# Patient Record
Sex: FEMALE | Race: ASIAN | NOT HISPANIC OR LATINO
[De-identification: names, ages, dates, MRNs, and addresses within clinical notes are randomized per-mention and may not be internally consistent; named-entity substitution may affect disease eponyms.]

---

## 2017-04-13 ENCOUNTER — APPOINTMENT (OUTPATIENT)
Dept: VASCULAR SURGERY | Facility: CLINIC | Age: 57
End: 2017-04-13

## 2017-04-13 VITALS — SYSTOLIC BLOOD PRESSURE: 124 MMHG | DIASTOLIC BLOOD PRESSURE: 74 MMHG | HEART RATE: 73 BPM

## 2017-04-13 VITALS
HEART RATE: 74 BPM | DIASTOLIC BLOOD PRESSURE: 61 MMHG | SYSTOLIC BLOOD PRESSURE: 117 MMHG | HEIGHT: 67 IN | BODY MASS INDEX: 32.02 KG/M2 | WEIGHT: 204 LBS | TEMPERATURE: 97.6 F

## 2017-11-09 ENCOUNTER — APPOINTMENT (OUTPATIENT)
Dept: VASCULAR SURGERY | Facility: CLINIC | Age: 57
End: 2017-11-09

## 2018-10-23 ENCOUNTER — OUTPATIENT (OUTPATIENT)
Dept: OUTPATIENT SERVICES | Facility: HOSPITAL | Age: 58
LOS: 1 days | Discharge: HOME | End: 2018-10-23
Payer: MEDICAID

## 2018-10-23 DIAGNOSIS — Z98.89 OTHER SPECIFIED POSTPROCEDURAL STATES: Chronic | ICD-10-CM

## 2018-10-23 DIAGNOSIS — R94.39 ABNORMAL RESULT OF OTHER CARDIOVASCULAR FUNCTION STUDY: ICD-10-CM

## 2018-10-23 LAB
BUN SERPL-MCNC: 18 MG/DL — SIGNIFICANT CHANGE UP (ref 7–23)
CALCIUM SERPL-MCNC: 9.2 MG/DL — SIGNIFICANT CHANGE UP (ref 8.4–10.5)
CHLORIDE SERPL-SCNC: 101 MMOL/L — SIGNIFICANT CHANGE UP (ref 98–107)
CO2 SERPL-SCNC: 26 MMOL/L — SIGNIFICANT CHANGE UP (ref 22–31)
CREAT SERPL-MCNC: 0.51 MG/DL — SIGNIFICANT CHANGE UP (ref 0.5–1.3)
GLUCOSE BLDC GLUCOMTR-MCNC: 119 MG/DL — HIGH (ref 70–99)
GLUCOSE BLDC GLUCOMTR-MCNC: 150 MG/DL — HIGH (ref 70–99)
GLUCOSE SERPL-MCNC: 155 MG/DL — HIGH (ref 70–99)
HBA1C BLD-MCNC: 8.6 % — HIGH (ref 4–5.6)
HCT VFR BLD CALC: 38.6 % — SIGNIFICANT CHANGE UP (ref 34.5–45)
HGB BLD-MCNC: 12.2 G/DL — SIGNIFICANT CHANGE UP (ref 11.5–15.5)
MCHC RBC-ENTMCNC: 25.5 PG — LOW (ref 27–34)
MCHC RBC-ENTMCNC: 31.6 % — LOW (ref 32–36)
MCV RBC AUTO: 80.6 FL — SIGNIFICANT CHANGE UP (ref 80–100)
NRBC # FLD: 0 — SIGNIFICANT CHANGE UP
PLATELET # BLD AUTO: 158 K/UL — SIGNIFICANT CHANGE UP (ref 150–400)
PMV BLD: 9.5 FL — SIGNIFICANT CHANGE UP (ref 7–13)
POTASSIUM SERPL-MCNC: 3.8 MMOL/L — SIGNIFICANT CHANGE UP (ref 3.5–5.3)
POTASSIUM SERPL-SCNC: 3.8 MMOL/L — SIGNIFICANT CHANGE UP (ref 3.5–5.3)
RBC # BLD: 4.79 M/UL — SIGNIFICANT CHANGE UP (ref 3.8–5.2)
RBC # FLD: 13.5 % — SIGNIFICANT CHANGE UP (ref 10.3–14.5)
SODIUM SERPL-SCNC: 139 MMOL/L — SIGNIFICANT CHANGE UP (ref 135–145)
WBC # BLD: 6.9 K/UL — SIGNIFICANT CHANGE UP (ref 3.8–10.5)
WBC # FLD AUTO: 6.9 K/UL — SIGNIFICANT CHANGE UP (ref 3.8–10.5)

## 2018-10-23 PROCEDURE — 93010 ELECTROCARDIOGRAM REPORT: CPT

## 2018-10-23 PROCEDURE — 93454 CORONARY ARTERY ANGIO S&I: CPT | Mod: 26

## 2018-10-23 PROCEDURE — 99152 MOD SED SAME PHYS/QHP 5/>YRS: CPT

## 2018-10-23 RX ORDER — SODIUM CHLORIDE 9 MG/ML
3 INJECTION INTRAMUSCULAR; INTRAVENOUS; SUBCUTANEOUS EVERY 8 HOURS
Qty: 0 | Refills: 0 | Status: DISCONTINUED | OUTPATIENT
Start: 2018-10-23 | End: 2018-11-07

## 2018-10-23 NOTE — H&P CARDIOLOGY - FAMILY HISTORY
No pertinent family history in first degree relatives     Family history of cancer     Sibling  Still living? Yes, Estimated age: Age Unknown  Family history of diabetes mellitus, Age at diagnosis: Age Unknown  Family history of hypertension, Age at diagnosis: Age Unknown  Family history of heart disease, Age at diagnosis: Age Unknown

## 2018-10-23 NOTE — H&P CARDIOLOGY - PSH
H/O carpal tunnel repair  right  S/P angioplasty with stent  2015  S/P carotid endarterectomy  right  S/P  section

## 2018-10-23 NOTE — H&P CARDIOLOGY - RS GEN PE MLT RESP DETAILS PC
no rhonchi/respirations non-labored/clear to auscultation bilaterally/good air movement/breath sounds equal/no rales/normal/airway patent

## 2018-10-23 NOTE — H&P CARDIOLOGY - HISTORY OF PRESENT ILLNESS
Patient is a 58 year old woman with history of NSTEMI/CAD s/p stent x1 (pLAD 2014) with 40% in stent restenosis on repeat cath (2016), HTN, HLD, DM, and s/p Right CEA sent for nuclear stress test after experiencing chest pain and shortness of breath with minimal exertion. NST found to be abnormal with moderate anterior wall perfusion defect and poor exercise performance. Patient sent to [facility] for cardiac catheterization for evaluation of progressive CAD. Patient currently laying in bed in NAD. Patient denies any current chest pain, SOB, NAYLOR, HA, dizziness, N/V/D/C, or recent illness,

## 2018-10-23 NOTE — H&P CARDIOLOGY - PMH
CAD (coronary artery disease)    Carotid artery occlusion    Diabetes    Diabetes mellitus type 2 in nonobese    HTN (hypertension)    Hyperlipidemia    Hypertension    Hypertriglyceridemia    NSTEMI (non-ST elevated myocardial infarction)  s/p stent placement to LAD  Obesity

## 2018-12-05 ENCOUNTER — EMERGENCY (EMERGENCY)
Facility: HOSPITAL | Age: 58
LOS: 0 days | Discharge: HOME | End: 2018-12-05
Attending: EMERGENCY MEDICINE
Payer: COMMERCIAL

## 2018-12-05 VITALS
DIASTOLIC BLOOD PRESSURE: 74 MMHG | WEIGHT: 203.93 LBS | OXYGEN SATURATION: 96 % | SYSTOLIC BLOOD PRESSURE: 180 MMHG | TEMPERATURE: 99 F | HEART RATE: 80 BPM | RESPIRATION RATE: 18 BRPM | HEIGHT: 67 IN

## 2018-12-05 DIAGNOSIS — S05.02XA INJURY OF CONJUNCTIVA AND CORNEAL ABRASION WITHOUT FOREIGN BODY, LEFT EYE, INITIAL ENCOUNTER: ICD-10-CM

## 2018-12-05 DIAGNOSIS — E66.9 OBESITY, UNSPECIFIED: ICD-10-CM

## 2018-12-05 DIAGNOSIS — I10 ESSENTIAL (PRIMARY) HYPERTENSION: ICD-10-CM
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
## 2018-12-05 DIAGNOSIS — G56.01 CARPAL TUNNEL SYNDROME, RIGHT UPPER LIMB: ICD-10-CM
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
## 2018-12-05 DIAGNOSIS — I25.2 OLD MYOCARDIAL INFARCTION: ICD-10-CM

## 2018-12-05 DIAGNOSIS — H57.12 OCULAR PAIN, LEFT EYE: ICD-10-CM

## 2018-12-05 DIAGNOSIS — Z98.89 OTHER SPECIFIED POSTPROCEDURAL STATES: Chronic | ICD-10-CM

## 2018-12-05 DIAGNOSIS — E78.5 HYPERLIPIDEMIA, UNSPECIFIED: ICD-10-CM

## 2018-12-05 DIAGNOSIS — I25.10 ATHEROSCLEROTIC HEART DISEASE OF NATIVE CORONARY ARTERY WITHOUT ANGINA PECTORIS: ICD-10-CM

## 2018-12-05 DIAGNOSIS — Z79.82 LONG TERM (CURRENT) USE OF ASPIRIN: ICD-10-CM

## 2018-12-05 DIAGNOSIS — E11.9 TYPE 2 DIABETES MELLITUS WITHOUT COMPLICATIONS: ICD-10-CM

## 2018-12-05 DIAGNOSIS — H92.02 OTALGIA, LEFT EAR: ICD-10-CM

## 2018-12-05 PROCEDURE — 99282 EMERGENCY DEPT VISIT SF MDM: CPT

## 2018-12-05 NOTE — ED PROVIDER NOTE - MEDICAL DECISION MAKING DETAILS
Patient with left eye injury from fabric.  VSS.  No changes in vision.  Exam shows corneal abrasion.  Patient advised to continue her eye drops and lubricant, says that she will be following up with her ophthalmologist, advised to return to the ER for any acute worsening, signs of corneal ulceration were discussed since she is a diabetic.

## 2018-12-05 NOTE — ED ADULT NURSE NOTE - NSIMPLEMENTINTERV_GEN_ALL_ED
Implemented All Universal Safety Interventions:  [place] to call system. Call bell, personal items and telephone within reach. Instruct patient to call for assistance. Room bathroom lighting operational. Non-slip footwear when patient is off stretcher. Physically safe environment: no spills, clutter or unnecessary equipment. Stretcher in lowest position, wheels locked, appropriate side rails in place.

## 2018-12-05 NOTE — ED PROVIDER NOTE - PHYSICAL EXAMINATION
Gen: Alert, NAD, well appearing  Head: NC, AT  Eye: PERRL, EOMI, normal lids, Right eye: grossly normal, no conjunctival erythema or tearing, no corneal abrasion, no blurry vision, Left eye: mild clear tearing, normal conjunctiva, IOPs 18, 20, +2mm corneal abrasion at 5 oclock, no blurry vision  ENT: normal hearing, patent oropharynx without erythema/exudate, uvula midline  Neck: +supple, +Trachea midline  Pulm: Bilateral BS, normal resp effort, no wheeze/stridor/retractions  CV: RRR, no M/R/G, +dist pulses  Abd: soft, NT/ND, +BS, no palpable masses  Mskel: no edema/erythema/cyanosis  Skin: no rash, warm/dry  Neuro: AAOx3, no apparent sensory/motor deficits, coordination intact

## 2018-12-05 NOTE — ED PROVIDER NOTE - OBJECTIVE STATEMENT
Pertinent PMH/PSH/FHx/SHx and Review of Systems contained within:  Patient presents to the ED for left ear tearing and pain.  Patient is well appearing, says that she hit her left eye with a piece of fabric earlier today while dressing, has since been having irritation and tearing.  Denies any vision changes or purulent drainage.  Patient is s/p cataract repair in [facility] 3 weeks ago, is already on moxifloxacin/dexamethasone and lubricant drops for another couple of weeks.  She also has an ophthalmologist in [facility], but came to the ER today because her eye was bothering her.  Denies use of contact lenses or glasses.     Relevant PMHx/SHx/SOCHx/FAMH:  HTN, HLD, DM, CAD/MI s/p 1 stent  Patient denies EtOH/tobacco/illicit substance use.    ROS: No fever/chills, No headache/photophobia/changes in vision, No ear pain/sore throat/dysphagia, No chest pain/palpitations, no SOB/cough/wheeze/stridor, No abdominal pain, No N/V/D/melena, no dysuria/frequency/discharge, No neck/back pain, no rash, no changes in neurological status/function.

## 2018-12-05 NOTE — ED ADULT NURSE NOTE - OBJECTIVE STATEMENT
patient received, came here for left eye pain started today, patient stated unsure if the end of her scarf accidentally hit her eye, complaining of irritation, noted no redness

## 2020-10-01 ENCOUNTER — APPOINTMENT (OUTPATIENT)
Dept: VASCULAR SURGERY | Facility: CLINIC | Age: 60
End: 2020-10-01
Payer: MEDICAID

## 2020-10-01 VITALS
HEART RATE: 75 BPM | DIASTOLIC BLOOD PRESSURE: 82 MMHG | TEMPERATURE: 97.8 F | HEIGHT: 67 IN | BODY MASS INDEX: 29.19 KG/M2 | SYSTOLIC BLOOD PRESSURE: 143 MMHG | WEIGHT: 186 LBS

## 2020-10-01 VITALS — HEART RATE: 76 BPM | DIASTOLIC BLOOD PRESSURE: 81 MMHG | SYSTOLIC BLOOD PRESSURE: 131 MMHG

## 2020-10-01 DIAGNOSIS — I65.21 OCCLUSION AND STENOSIS OF RIGHT CAROTID ARTERY: ICD-10-CM

## 2020-10-01 PROCEDURE — 93880 EXTRACRANIAL BILAT STUDY: CPT

## 2020-10-01 PROCEDURE — 99203 OFFICE O/P NEW LOW 30 MIN: CPT

## 2022-05-11 ENCOUNTER — EMERGENCY (EMERGENCY)
Facility: HOSPITAL | Age: 62
LOS: 0 days | Discharge: HOME | End: 2022-05-11
Attending: STUDENT IN AN ORGANIZED HEALTH CARE EDUCATION/TRAINING PROGRAM
Payer: COMMERCIAL

## 2022-05-11 VITALS
TEMPERATURE: 98 F | HEIGHT: 67 IN | SYSTOLIC BLOOD PRESSURE: 146 MMHG | DIASTOLIC BLOOD PRESSURE: 78 MMHG | HEART RATE: 74 BPM | OXYGEN SATURATION: 97 % | RESPIRATION RATE: 19 BRPM | WEIGHT: 179.9 LBS

## 2022-05-11 DIAGNOSIS — Z98.89 OTHER SPECIFIED POSTPROCEDURAL STATES: Chronic | ICD-10-CM

## 2022-05-11 DIAGNOSIS — E78.5 HYPERLIPIDEMIA, UNSPECIFIED: ICD-10-CM

## 2022-05-11 DIAGNOSIS — Z79.82 LONG TERM (CURRENT) USE OF ASPIRIN: ICD-10-CM

## 2022-05-11 DIAGNOSIS — E78.1 PURE HYPERGLYCERIDEMIA: ICD-10-CM

## 2022-05-11 DIAGNOSIS — E66.9 OBESITY, UNSPECIFIED: ICD-10-CM

## 2022-05-11 DIAGNOSIS — Z79.02 LONG TERM (CURRENT) USE OF ANTITHROMBOTICS/ANTIPLATELETS: ICD-10-CM

## 2022-05-11 DIAGNOSIS — I25.2 OLD MYOCARDIAL INFARCTION: ICD-10-CM

## 2022-05-11 DIAGNOSIS — E11.9 TYPE 2 DIABETES MELLITUS WITHOUT COMPLICATIONS: ICD-10-CM

## 2022-05-11 DIAGNOSIS — I25.10 ATHEROSCLEROTIC HEART DISEASE OF NATIVE CORONARY ARTERY WITHOUT ANGINA PECTORIS: ICD-10-CM

## 2022-05-11 DIAGNOSIS — M25.561 PAIN IN RIGHT KNEE: ICD-10-CM

## 2022-05-11 DIAGNOSIS — Z79.84 LONG TERM (CURRENT) USE OF ORAL HYPOGLYCEMIC DRUGS: ICD-10-CM

## 2022-05-11 DIAGNOSIS — I10 ESSENTIAL (PRIMARY) HYPERTENSION: ICD-10-CM

## 2022-05-11 DIAGNOSIS — Z95.5 PRESENCE OF CORONARY ANGIOPLASTY IMPLANT AND GRAFT: ICD-10-CM

## 2022-05-11 PROCEDURE — 99283 EMERGENCY DEPT VISIT LOW MDM: CPT

## 2022-05-11 PROCEDURE — 73562 X-RAY EXAM OF KNEE 3: CPT | Mod: 26,RT

## 2022-05-11 RX ORDER — LOSARTAN POTASSIUM 100 MG/1
1 TABLET, FILM COATED ORAL
Qty: 0 | Refills: 0 | DISCHARGE

## 2022-05-11 RX ORDER — SITAGLIPTIN AND METFORMIN HYDROCHLORIDE 500; 50 MG/1; MG/1
1 TABLET, FILM COATED ORAL
Qty: 0 | Refills: 0 | DISCHARGE

## 2022-05-11 NOTE — ED PROVIDER NOTE - NS ED ROS FT
Constitutional: See HPI.  MS: see hpi   Neuro: see hpi   Skin: No skin rash.  Except as documented in the HPI, all other systems are negative.

## 2022-05-11 NOTE — ED PROVIDER NOTE - NSFOLLOWUPINSTRUCTIONS_ED_ALL_ED_FT

## 2022-05-11 NOTE — ED ADULT NURSE NOTE - NSICDXPASTMEDICALHX_GEN_ALL_CORE_FT
PAST MEDICAL HISTORY:  CAD (coronary artery disease)     Carotid artery occlusion     Diabetes     Diabetes mellitus type 2 in nonobese     HTN (hypertension)     Hyperlipidemia     Hypertension     Hypertriglyceridemia     NSTEMI (non-ST elevated myocardial infarction) s/p stent placement to LAD    Obesity

## 2022-05-11 NOTE — ED ADULT TRIAGE NOTE - CHIEF COMPLAINT QUOTE
Right knee pain and swelling since last week  Worsening pain yesterday  HX HTN, DM, carotid surgery, CAD with stents

## 2022-05-11 NOTE — ED ADULT NURSE NOTE - OBJECTIVE STATEMENT
received er chair 26 c/o r popliteal/r knee pain intermittently x 4-5 days worse upon bending knee slight swelling noted no deformity noted denies recent injury or known trauma

## 2022-05-11 NOTE — ED ADULT NURSE NOTE - NSICDXFAMILYHX_GEN_ALL_CORE_FT
FAMILY HISTORY:  Family history of cancer  No pertinent family history in first degree relatives    Sibling  Still living? Yes, Estimated age: Age Unknown  Family history of diabetes mellitus, Age at diagnosis: Age Unknown  Family history of heart disease, Age at diagnosis: Age Unknown  Family history of hypertension, Age at diagnosis: Age Unknown

## 2022-05-11 NOTE — ED PROVIDER NOTE - PATIENT PORTAL LINK FT
You can access the FollowMyHealth Patient Portal offered by [facility] by registering at the following website: [email or web address]. By joining [employer]’s FollowMyHealth portal, you will also be able to view your health information using other applications (apps) compatible with our system.

## 2022-05-11 NOTE — ED PROVIDER NOTE - PHYSICAL EXAMINATION
VITAL SIGNS: I have reviewed nursing notes and confirm.  CONSTITUTIONAL: well-appearing, non-toxic, NAD  SKIN: Warm dry, normal skin turgor  HEAD: NCAT  CARD: RRR, no murmurs, rubs or gallops  RESP: clear to ausculation b/l.  No rales, rhonchi, or wheezing  EXT: Full ROM, no bony tenderness, no pedal edema, no calf tenderness  NEURO: normal motor. normal sensory. Normal gait.  PSYCH: Cooperative, appropriate.

## 2022-05-11 NOTE — ED PROVIDER NOTE - OBJECTIVE STATEMENT
62F with no significant pmhs presenting to ED for right knee pain x 5 days, worse w/ flexing knee, pain to posterior knee and states feels pressure behind knee. Patient denies any fevers/chills, trauma, rash or other complaints.

## 2022-05-11 NOTE — ED ADULT NURSE NOTE - NSICDXPASTSURGICALHX_GEN_ALL_CORE_FT
PAST SURGICAL HISTORY:  H/O carpal tunnel repair right    S/P angioplasty with stent 2015    S/P carotid endarterectomy right    S/P  section

## 2022-05-11 NOTE — ED PROVIDER NOTE - CARE PROVIDER_API CALL
Nolvia Chavez)  Orthopaedic Surgery; Surgery of the Hand  [address] [place] [address]  Phone: [phone]  Fax: [phone]  Follow Up Time: 4-6 Days

## 2022-06-05 ENCOUNTER — FORM ENCOUNTER (OUTPATIENT)
Age: 62
End: 2022-06-05

## 2022-06-06 ENCOUNTER — APPOINTMENT (OUTPATIENT)
Dept: MRI IMAGING | Facility: CLINIC | Age: 62
End: 2022-06-06
Payer: MEDICAID

## 2022-06-06 ENCOUNTER — APPOINTMENT (OUTPATIENT)
Dept: ORTHOPEDIC SURGERY | Facility: CLINIC | Age: 62
End: 2022-06-06
Payer: MEDICAID

## 2022-06-06 VITALS — BODY MASS INDEX: 29.03 KG/M2 | HEIGHT: 67 IN | WEIGHT: 185 LBS

## 2022-06-06 DIAGNOSIS — M23.91 UNSPECIFIED INTERNAL DERANGEMENT OF RIGHT KNEE: ICD-10-CM

## 2022-06-06 DIAGNOSIS — I21.9 ACUTE MYOCARDIAL INFARCTION, UNSPECIFIED: ICD-10-CM

## 2022-06-06 DIAGNOSIS — E11.9 TYPE 2 DIABETES MELLITUS W/OUT COMPLICATIONS: ICD-10-CM

## 2022-06-06 PROCEDURE — 73721 MRI JNT OF LWR EXTRE W/O DYE: CPT | Mod: RT

## 2022-06-06 PROCEDURE — 99204 OFFICE O/P NEW MOD 45 MIN: CPT

## 2022-06-06 PROCEDURE — 73562 X-RAY EXAM OF KNEE 3: CPT | Mod: RT

## 2022-06-06 NOTE — DISCUSSION/SUMMARY
[de-identified] : will get her in a hinged knee brace and a stat mri to evaluae insufiency fracture vs acute meniscal tear

## 2022-06-06 NOTE — DATA REVIEWED
[Right] : of the right [Knee] : knee [I reviewed the films/CD] : I reviewed the films/CD [FreeTextEntry1] : normal

## 2022-06-06 NOTE — PHYSICAL EXAM
[Positive] : positive Jenelle [] : negative Lachmann [Right] : right knee [AP] : anteroposterior [Lateral] : lateral [[place]] : skyline [There are no fractures, subluxations or dislocations. No significant abnormalities are seen] : There are no fractures, subluxations or dislocations. No significant abnormalities are seen [FreeTextEntry8] : she is guarding and exam is difficult  [de-identified] : able to slr [TWNoteComboBox7] : flexion 100 degrees [de-identified] : extension 3 degrees

## 2022-06-06 NOTE — HISTORY OF PRESENT ILLNESS
[Sudden] : sudden [10] : 10 [8] : 8 [Dull/Aching] : dull/aching [Localized] : localized [Sharp] : sharp [Constant] : constant [Nothing helps with pain getting better] : Nothing helps with pain getting better [Walking] : walking [Bending forward] : bending forward [Stairs] : stairs [de-identified] : 6-6-22- She states 3 + week of worsening posterior right knee pain. Initially went to an urgent care and was then sent to [facility]. She had xrays which I reviewed today and are normal. She had a doppler which is reported as negative and she was told to take otc nsaids. She states was doing somewhat better and this weekend had a crack sound as she was doing stairs. since then significant increase in pain and inability to ambulate. she is guarding. \par \par PMH mi, cad, dm on plavix [] : no [FreeTextEntry1] : RT Knee [FreeTextEntry3] : 3 weeks  [FreeTextEntry5] : Pt present with Rt knee pain and swelling . Pt stated that there is a lot of pain when she bends her knee. On 5/11/22 she went to [facility] where they did a x-ray. On 6/4/2022 she was going up the stairs when she heard a cracked in her rt knee.  [de-identified] : 5/11/22 [de-identified] : [facility]

## 2022-06-07 ENCOUNTER — APPOINTMENT (OUTPATIENT)
Dept: ORTHOPEDIC SURGERY | Facility: CLINIC | Age: 62
End: 2022-06-07
Payer: MEDICAID

## 2022-06-07 VITALS — BODY MASS INDEX: 29.03 KG/M2 | WEIGHT: 185 LBS | HEIGHT: 67 IN

## 2022-06-07 PROCEDURE — 20610 DRAIN/INJ JOINT/BURSA W/O US: CPT

## 2022-06-07 PROCEDURE — 99213 OFFICE O/P EST LOW 20 MIN: CPT | Mod: 25

## 2022-06-07 NOTE — DISCUSSION/SUMMARY
[de-identified] : would not suggest surgery for the root tear. Will inject with cortisone for the OA, start course of PT

## 2022-06-07 NOTE — PROCEDURE
[Large Joint Injection] : Large joint injection [Right] : of the right [Knee] : knee [Pain] : pain [Alcohol] : alcohol [Betadine] : betadine [Ethyl Chloride sprayed topically] : ethyl chloride sprayed topically [___ cc    3mg] :  Betamethasone (Celestone) ~Vcc of 3mg [___ cc    1%] : Lidocaine ~Vcc of 1%

## 2022-06-07 NOTE — DATA REVIEWED
[MRI] : MRI [Right] : of the right [Knee] : knee [I reviewed the films/CD and agree] : I reviewed the films/CD and agree [I reviewed the films/CD] : I reviewed the films/CD

## 2022-06-07 NOTE — PHYSICAL EXAM
[Positive] : positive Jenelle [Right] : right knee [AP] : anteroposterior [Lateral] : lateral [[place]] : skyline [There are no fractures, subluxations or dislocations. No significant abnormalities are seen] : There are no fractures, subluxations or dislocations. No significant abnormalities are seen [] : negative Lachmann [FreeTextEntry8] : she is guarding and exam is difficult  [de-identified] : able to slr [TWNoteComboBox7] : flexion 100 degrees [de-identified] : extension 3 degrees

## 2022-06-07 NOTE — REASON FOR VISIT
[FreeTextEntry2] : 6/7/22- Had MRI: Impression: \par 1. Medial meniscal tear extending through the root with 5 mm traction cyst and traction edema at the tibial root \par insertion.\par 2. Arthrosis most noted of the medial joint with joint effusion.\par 3. Subacute partial tear of the MCL at the femur.\par 4. Chronic proximal lateral collateral ligament sprain.\par 5. Minor patellofemoral arthrosis.\par 6. Hamstring and gastrocnemius tendinopathy with soft tissue edema.

## 2022-07-19 ENCOUNTER — APPOINTMENT (OUTPATIENT)
Dept: ORTHOPEDIC SURGERY | Facility: CLINIC | Age: 62
End: 2022-07-19

## 2022-07-19 VITALS — BODY MASS INDEX: 29.03 KG/M2 | WEIGHT: 185 LBS | HEIGHT: 67 IN

## 2022-07-19 DIAGNOSIS — S83.221A PERIPHERAL TEAR OF MEDIAL MENISCUS, CURRENT INJURY, RIGHT KNEE, INITIAL ENCOUNTER: ICD-10-CM

## 2022-07-19 DIAGNOSIS — M17.11 UNILATERAL PRIMARY OSTEOARTHRITIS, RIGHT KNEE: ICD-10-CM

## 2022-07-19 PROCEDURE — 99213 OFFICE O/P EST LOW 20 MIN: CPT

## 2022-07-19 NOTE — PHYSICAL EXAM
[Positive] : positive Jenelle [Right] : right knee [AP] : anteroposterior [Lateral] : lateral [[place]] : skyline [There are no fractures, subluxations or dislocations. No significant abnormalities are seen] : There are no fractures, subluxations or dislocations. No significant abnormalities are seen [] : negative Lachmann [FreeTextEntry8] : she is guarding and exam is difficult  [de-identified] : able to slr [TWNoteComboBox7] : flexion 100 degrees [de-identified] : extension 3 degrees

## 2022-07-19 NOTE — HISTORY OF PRESENT ILLNESS
[Sudden] : sudden [10] : 10 [8] : 8 [Dull/Aching] : dull/aching [Localized] : localized [Sharp] : sharp [Constant] : constant [Nothing helps with pain getting better] : Nothing helps with pain getting better [Walking] : walking [Bending forward] : bending forward [Stairs] : stairs [de-identified] : 6-6-22- She states 3 + week of worsening posterior right knee pain. Initially went to an urgent care and was then sent to [facility]. She had xrays which I reviewed today and are normal. She had a doppler which is reported as negative and she was told to take otc nsaids. She states was doing somewhat better and this weekend had a crack sound as she was doing stairs. since then significant increase in pain and inability to ambulate. she is guarding. \par \par PMH mi, cad, dm on plavix [] : no [FreeTextEntry1] : RT Knee [FreeTextEntry3] : 3 weeks  [FreeTextEntry5] : Pt present with Rt knee pain and swelling . Pt stated that there is a lot of pain when she bends her knee. On 5/11/22 she went to [facility] where they did a x-ray. On 6/4/2022 she was going up the stairs when she heard a cracked in her rt knee.  [de-identified] : 5/11/22 [de-identified] : [facility]

## 2022-07-19 NOTE — DISCUSSION/SUMMARY
[de-identified] : Patient needs PT for right knee MMT with OA. this should be 2x/week for 8 weeks. It is medically necessary in lieu of knee arthroscopy

## 2022-07-19 NOTE — REASON FOR VISIT
[FreeTextEntry2] : 7/19/22- Had relief with injection, PT hasn't been authorized yet\par 6/7/22- Had MRI: Impression: \par 1. Medial meniscal tear extending through the root with 5 mm traction cyst and traction edema at the tibial root \par insertion.\par 2. Arthrosis most noted of the medial joint with joint effusion.\par 3. Subacute partial tear of the MCL at the femur.\par 4. Chronic proximal lateral collateral ligament sprain.\par 5. Minor patellofemoral arthrosis.\par 6. Hamstring and gastrocnemius tendinopathy with soft tissue edema.

## 2022-08-04 ENCOUNTER — APPOINTMENT (OUTPATIENT)
Dept: ORTHOPEDIC SURGERY | Facility: CLINIC | Age: 62
End: 2022-08-04

## 2023-10-05 ENCOUNTER — EMERGENCY (EMERGENCY)
Facility: HOSPITAL | Age: 63
LOS: 0 days | Discharge: HOME | End: 2023-10-05
Attending: STUDENT IN AN ORGANIZED HEALTH CARE EDUCATION/TRAINING PROGRAM
Payer: COMMERCIAL

## 2023-10-05 VITALS
DIASTOLIC BLOOD PRESSURE: 84 MMHG | SYSTOLIC BLOOD PRESSURE: 145 MMHG | HEIGHT: 67 IN | OXYGEN SATURATION: 98 % | TEMPERATURE: 98 F | HEART RATE: 77 BPM | WEIGHT: 188.94 LBS | RESPIRATION RATE: 18 BRPM

## 2023-10-05 VITALS
HEART RATE: 78 BPM | SYSTOLIC BLOOD PRESSURE: 140 MMHG | RESPIRATION RATE: 18 BRPM | OXYGEN SATURATION: 99 % | DIASTOLIC BLOOD PRESSURE: 82 MMHG | TEMPERATURE: 98 F

## 2023-10-05 DIAGNOSIS — I25.10 ATHEROSCLEROTIC HEART DISEASE OF NATIVE CORONARY ARTERY WITHOUT ANGINA PECTORIS: ICD-10-CM

## 2023-10-05 DIAGNOSIS — R51.9 HEADACHE, UNSPECIFIED: ICD-10-CM

## 2023-10-05 DIAGNOSIS — Z98.89 OTHER SPECIFIED POSTPROCEDURAL STATES: Chronic | ICD-10-CM

## 2023-10-05 DIAGNOSIS — R42 DIZZINESS AND GIDDINESS: ICD-10-CM

## 2023-10-05 DIAGNOSIS — R11.0 NAUSEA: ICD-10-CM

## 2023-10-05 DIAGNOSIS — Z79.84 LONG TERM (CURRENT) USE OF ORAL HYPOGLYCEMIC DRUGS: ICD-10-CM

## 2023-10-05 DIAGNOSIS — Z79.82 LONG TERM (CURRENT) USE OF ASPIRIN: ICD-10-CM

## 2023-10-05 DIAGNOSIS — Z79.02 LONG TERM (CURRENT) USE OF ANTITHROMBOTICS/ANTIPLATELETS: ICD-10-CM

## 2023-10-05 DIAGNOSIS — I10 ESSENTIAL (PRIMARY) HYPERTENSION: ICD-10-CM

## 2023-10-05 DIAGNOSIS — E11.9 TYPE 2 DIABETES MELLITUS WITHOUT COMPLICATIONS: ICD-10-CM

## 2023-10-05 LAB
ALBUMIN SERPL ELPH-MCNC: 3.5 G/DL — SIGNIFICANT CHANGE UP (ref 3.3–5)
ALP SERPL-CCNC: 91 U/L — SIGNIFICANT CHANGE UP (ref 40–120)
ALT FLD-CCNC: 26 U/L — SIGNIFICANT CHANGE UP (ref 12–78)
ANION GAP SERPL CALC-SCNC: 8 MMOL/L — SIGNIFICANT CHANGE UP (ref 5–17)
AST SERPL-CCNC: 16 U/L — SIGNIFICANT CHANGE UP (ref 15–37)
BASOPHILS # BLD AUTO: 0.04 K/UL — SIGNIFICANT CHANGE UP (ref 0–0.2)
BASOPHILS NFR BLD AUTO: 0.5 % — SIGNIFICANT CHANGE UP (ref 0–2)
BILIRUB SERPL-MCNC: 0.3 MG/DL — SIGNIFICANT CHANGE UP (ref 0.2–1.2)
BUN SERPL-MCNC: 13 MG/DL — SIGNIFICANT CHANGE UP (ref 7–23)
CALCIUM SERPL-MCNC: 8.7 MG/DL — SIGNIFICANT CHANGE UP (ref 8.5–10.1)
CHLORIDE SERPL-SCNC: 102 MMOL/L — SIGNIFICANT CHANGE UP (ref 96–108)
CO2 SERPL-SCNC: 25 MMOL/L — SIGNIFICANT CHANGE UP (ref 22–31)
CREAT SERPL-MCNC: 0.66 MG/DL — SIGNIFICANT CHANGE UP (ref 0.5–1.3)
EGFR: 100 ML/MIN/1.73M2 — SIGNIFICANT CHANGE UP
EOSINOPHIL # BLD AUTO: 0.21 K/UL — SIGNIFICANT CHANGE UP (ref 0–0.5)
EOSINOPHIL NFR BLD AUTO: 2.7 % — SIGNIFICANT CHANGE UP (ref 0–6)
GLUCOSE SERPL-MCNC: 223 MG/DL — HIGH (ref 70–99)
HCT VFR BLD CALC: 38 % — SIGNIFICANT CHANGE UP (ref 34.5–45)
HGB BLD-MCNC: 12.1 G/DL — SIGNIFICANT CHANGE UP (ref 11.5–15.5)
IMM GRANULOCYTES NFR BLD AUTO: 0.3 % — SIGNIFICANT CHANGE UP (ref 0–0.9)
LIDOCAIN IGE QN: 47 U/L — SIGNIFICANT CHANGE UP (ref 13–75)
LYMPHOCYTES # BLD AUTO: 2.27 K/UL — SIGNIFICANT CHANGE UP (ref 1–3.3)
LYMPHOCYTES # BLD AUTO: 29 % — SIGNIFICANT CHANGE UP (ref 13–44)
MAGNESIUM SERPL-MCNC: 2.1 MG/DL — SIGNIFICANT CHANGE UP (ref 1.6–2.6)
MCHC RBC-ENTMCNC: 25.8 PG — LOW (ref 27–34)
MCHC RBC-ENTMCNC: 31.8 G/DL — LOW (ref 32–36)
MCV RBC AUTO: 81 FL — SIGNIFICANT CHANGE UP (ref 80–100)
MONOCYTES # BLD AUTO: 0.38 K/UL — SIGNIFICANT CHANGE UP (ref 0–0.9)
MONOCYTES NFR BLD AUTO: 4.9 % — SIGNIFICANT CHANGE UP (ref 2–14)
NEUTROPHILS # BLD AUTO: 4.9 K/UL — SIGNIFICANT CHANGE UP (ref 1.8–7.4)
NEUTROPHILS NFR BLD AUTO: 62.6 % — SIGNIFICANT CHANGE UP (ref 43–77)
NRBC # BLD: 0 /100 WBCS — SIGNIFICANT CHANGE UP (ref 0–0)
PLATELET # BLD AUTO: 190 K/UL — SIGNIFICANT CHANGE UP (ref 150–400)
POTASSIUM SERPL-MCNC: 4 MMOL/L — SIGNIFICANT CHANGE UP (ref 3.5–5.3)
POTASSIUM SERPL-SCNC: 4 MMOL/L — SIGNIFICANT CHANGE UP (ref 3.5–5.3)
PROT SERPL-MCNC: 6.9 GM/DL — SIGNIFICANT CHANGE UP (ref 6–8.3)
RBC # BLD: 4.69 M/UL — SIGNIFICANT CHANGE UP (ref 3.8–5.2)
RBC # FLD: 13.5 % — SIGNIFICANT CHANGE UP (ref 10.3–14.5)
SODIUM SERPL-SCNC: 135 MMOL/L — SIGNIFICANT CHANGE UP (ref 135–145)
TROPONIN I, HIGH SENSITIVITY RESULT: 19.5 NG/L — SIGNIFICANT CHANGE UP
WBC # BLD: 7.82 K/UL — SIGNIFICANT CHANGE UP (ref 3.8–10.5)
WBC # FLD AUTO: 7.82 K/UL — SIGNIFICANT CHANGE UP (ref 3.8–10.5)

## 2023-10-05 PROCEDURE — 93010 ELECTROCARDIOGRAM REPORT: CPT

## 2023-10-05 PROCEDURE — 99285 EMERGENCY DEPT VISIT HI MDM: CPT

## 2023-10-05 PROCEDURE — 70496 CT ANGIOGRAPHY HEAD: CPT | Mod: 26,MC

## 2023-10-05 PROCEDURE — 71045 X-RAY EXAM CHEST 1 VIEW: CPT | Mod: 26

## 2023-10-05 PROCEDURE — 70450 CT HEAD/BRAIN W/O DYE: CPT | Mod: 26,MC,59

## 2023-10-05 PROCEDURE — 70498 CT ANGIOGRAPHY NECK: CPT | Mod: 26,MC

## 2023-10-05 RX ORDER — MECLIZINE HCL 12.5 MG
50 TABLET ORAL ONCE
Refills: 0 | Status: COMPLETED | OUTPATIENT
Start: 2023-10-05 | End: 2023-10-05

## 2023-10-05 RX ORDER — METOCLOPRAMIDE HCL 10 MG
10 TABLET ORAL ONCE
Refills: 0 | Status: COMPLETED | OUTPATIENT
Start: 2023-10-05 | End: 2023-10-05

## 2023-10-05 RX ADMIN — Medication 50 MILLIGRAM(S): at 17:12

## 2023-10-05 RX ADMIN — Medication 10 MILLIGRAM(S): at 17:12

## 2023-10-05 NOTE — ED PROVIDER NOTE - PATIENT PORTAL LINK FT
You can access the FollowMyHealth Patient Portal offered by [facility] by registering at the following website: [email or web address]. By joining [employer]’s FollowMyHealth portal, you will also be able to view your health information using other applications (apps) compatible with our system.

## 2023-10-05 NOTE — ED PROVIDER NOTE - WR ORDER NAME 1
83 y/o AA F with hx of HTN, HLD, Parkinson's disease, stroked, L hip replacement to ED after experiencing an unwitnessed, ground-level trip and fall at home immediately PTA - daughter reports she found her mother on floor - pt denies LOC, states she lost her balance. This is the second fall in the past week per daughter, which immediately resulting in loss of ROM and pain to the R hip area. Denies chest pain, edema, palpitations, SOB, wheezing, abdominal pain, N/V/D, constipation, dysuria, flank pain, HA, dizziness, fever, cough, chills. Found in ED to have R femoral neck fracture - labs largely within normal limits and VSS, COVID-19 negative. Hospital medicine was called and pt was placed in observation on telemetry with plan for likely surgical intervention in AM. Pt is a full code - her daughter Josee Lemus is her surrogate decision maker.    Xray Chest 1 View- PORTABLE-Urgent

## 2023-10-05 NOTE — ED PROVIDER NOTE - OBJECTIVE STATEMENT
63F PMH HTN, DM, CAD s/p PCI on DAP, hx carotid R enterectomy BIBEMS d/t dizziness a/w h/a, nausea. Pt reports getting onto flight (1.5hr) this AM, onset mild h/a, sister gave pt Tylenol, took w/o relief. Pt w/ onset BL ear pressure during flight. Upon landing pt w/ dizziness, feeling like 'everything was moving', unable to walk unassisted. Pt made it to car, w/ onset nausea. Pt reports dizziness better w/ laying still, worse w/ change in position. Pt reports persistent h/a, BL ear pressure & nausea improved. Denies F/C, blurred vision, neck pain, CP, SOB, abd pain, vomiting, LE swelling.     PMH as above, PSH as above, NKDA, meds as listed (not on AC), NKDA.

## 2023-10-05 NOTE — ED PROVIDER NOTE - NSFOLLOWUPINSTRUCTIONS_ED_ALL_ED_FT
Followup with primary care doctor in next 7 days.     Dizziness    Dizziness can manifest as a feeling of unsteadiness or light-headedness. You may feel like you are about to faint. This condition can be caused by a number of things, including medicines, dehydration, or illness. Drink enough fluid to keep your urine clear or pale yellow. Do not drink alcohol and limit your caffeine intake. Avoid quick or sudden movements.  Rise slowly from chairs and steady yourself until you feel okay. In the morning, first sit up on the side of the bed.    SEEK IMMEDIATE MEDICAL CARE IF YOU HAVE ANY OF THE FOLLOWING SYMPTOMS: vomiting, changes in your vision or speech, weakness in your arms or legs, trouble speaking or swallowing, chest pain, abdominal pain, shortness of breath, sweating, bleeding, headache, neck pain, or fever.

## 2023-10-05 NOTE — ED ADULT NURSE NOTE - NSFALLUNIVINTERV_ED_ALL_ED
Bed/Stretcher in lowest position, wheels locked, appropriate side rails in place/Call bell, personal items and telephone in reach/Instruct patient to call for assistance before getting out of bed/chair/stretcher/Non-slip footwear applied when patient is off stretcher/[place] to call system/Physically safe environment - no spills, clutter or unnecessary equipment/Purposeful proactive rounding/Room/bathroom lighting operational, light cord in reach

## 2023-10-05 NOTE — ED PROVIDER NOTE - PHYSICAL EXAMINATION
GEN: Awake, alert, interactive, NAD.  HEAD AND NECK: NC/AT. Airway patent. Neck supple.   EYES:  Clear b/l. EOMI. PERRL.   ENT: Moist mucus membranes.   CARDIAC: Regular rate, regular rhythm. No evident pedal edema.    RESP/CHEST: Normal respiratory effort with no use of accessory muscles or retractions. Clear throughout on auscultation.  ABD: Soft, non-distended, non-tender. No rebound, no guarding.   BACK: No midline spinal TTP. No CVAT.   EXTREMITIES: Moving all extremities with no apparent deformities.   SKIN: Warm, dry, intact normal color. No rash.   NEURO: AOx3, CN II-XII grossly intact, no focal deficits. No dysmetria.   PSYCH: Appropriate mood and affect.

## 2023-10-05 NOTE — ED PROVIDER NOTE - PROGRESS NOTE DETAILS
signed out from dr. morrison. well appearing, patient endorsing significantly improved symptoms. ambulated to bathroom with daughter who is now at bedside. daughter states she is comfortable to take patient home. CT results discussed w/ patient/daughter. Conversation had with patient regarding results of testing. Patient agrees with plan for discharge at this time. Patient agrees to comply with follow up with PCP. Return to ED precautions and discharge instructions given to patient.  -Francis

## 2023-10-05 NOTE — ED ADULT NURSE NOTE - OBJECTIVE STATEMENT
pt c/o dizziness, lightheaded, @ around 1400. pt s/p flight from [place] to [facility] and got dizzy upon departing the flight.  pt c/o neck pain and headache.  PMH HDL, DM, angioplastic on anticoagulation.

## 2023-10-05 NOTE — ED PROVIDER NOTE - CLINICAL SUMMARY MEDICAL DECISION MAKING FREE TEXT BOX
63F PMH HTN, DM, CAD pw dizziness, h/a, nausea s/p flight. Afebrile, VSS. Well appearing, in NAD. No focal neuro deficits in exam. ECG NSR w/o evidence acute ischemia. 63F PMH HTN, DM, CAD pw dizziness, h/a, nausea s/p flight. Afebrile, VSS. Well appearing, in NAD. No focal neuro deficits in exam. ECG NSR w/o evidence acute ischemia. Pt care signed out at change of shift, pending labs / CT imaging.

## 2024-02-16 NOTE — ED ADULT NURSE NOTE - NS_SISCREENINGSR_GEN_ALL_ED
Detail Level: Simple Instructions: This plan will send the code FBSD to the PM system.  DO NOT or CHANGE the price. Price (Do Not Change): 0.00 Negative

## 2024-02-24 ENCOUNTER — EMERGENCY (EMERGENCY)
Facility: HOSPITAL | Age: 64
LOS: 0 days | Discharge: HOME | End: 2024-02-24
Attending: STUDENT IN AN ORGANIZED HEALTH CARE EDUCATION/TRAINING PROGRAM
Payer: MEDICAID

## 2024-02-24 VITALS
DIASTOLIC BLOOD PRESSURE: 73 MMHG | TEMPERATURE: 98 F | OXYGEN SATURATION: 98 % | HEART RATE: 80 BPM | RESPIRATION RATE: 18 BRPM | SYSTOLIC BLOOD PRESSURE: 126 MMHG

## 2024-02-24 VITALS
TEMPERATURE: 98 F | HEIGHT: 66 IN | HEART RATE: 84 BPM | OXYGEN SATURATION: 98 % | DIASTOLIC BLOOD PRESSURE: 82 MMHG | WEIGHT: 188.05 LBS | SYSTOLIC BLOOD PRESSURE: 160 MMHG | RESPIRATION RATE: 18 BRPM
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
## 2024-02-24 DIAGNOSIS — M25.552 PAIN IN LEFT HIP: ICD-10-CM

## 2024-02-24 DIAGNOSIS — I10 ESSENTIAL (PRIMARY) HYPERTENSION: ICD-10-CM

## 2024-02-24 DIAGNOSIS — Z98.89 OTHER SPECIFIED POSTPROCEDURAL STATES: Chronic | ICD-10-CM

## 2024-02-24 DIAGNOSIS — E78.5 HYPERLIPIDEMIA, UNSPECIFIED: ICD-10-CM

## 2024-02-24 DIAGNOSIS — I25.2 OLD MYOCARDIAL INFARCTION: ICD-10-CM

## 2024-02-24 DIAGNOSIS — E66.9 OBESITY, UNSPECIFIED: ICD-10-CM

## 2024-02-24 DIAGNOSIS — I25.10 ATHEROSCLEROTIC HEART DISEASE OF NATIVE CORONARY ARTERY WITHOUT ANGINA PECTORIS: ICD-10-CM

## 2024-02-24 DIAGNOSIS — S09.90XA UNSPECIFIED INJURY OF HEAD, INITIAL ENCOUNTER: ICD-10-CM

## 2024-02-24 DIAGNOSIS — M79.672 PAIN IN LEFT FOOT: ICD-10-CM

## 2024-02-24 DIAGNOSIS — M79.89 OTHER SPECIFIED SOFT TISSUE DISORDERS: ICD-10-CM
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
## 2024-02-24 DIAGNOSIS — T14.8XXA OTHER INJURY OF UNSPECIFIED BODY REGION, INITIAL ENCOUNTER: ICD-10-CM

## 2024-02-24 PROCEDURE — 99285 EMERGENCY DEPT VISIT HI MDM: CPT

## 2024-02-24 PROCEDURE — 70450 CT HEAD/BRAIN W/O DYE: CPT | Mod: 26,MC

## 2024-02-24 PROCEDURE — 72125 CT NECK SPINE W/O DYE: CPT | Mod: 26,MC

## 2024-02-24 PROCEDURE — 73522 X-RAY EXAM HIPS BI 3-4 VIEWS: CPT | Mod: 26

## 2024-02-24 PROCEDURE — 73630 X-RAY EXAM OF FOOT: CPT | Mod: 26,LT

## 2024-02-24 RX ORDER — TRAMADOL HYDROCHLORIDE 50 MG/1
25 TABLET ORAL ONCE
Refills: 0 | Status: DISCONTINUED | OUTPATIENT
Start: 2024-02-24 | End: 2024-02-24

## 2024-02-24 RX ADMIN — TRAMADOL HYDROCHLORIDE 25 MILLIGRAM(S): 50 TABLET ORAL at 06:40

## 2024-02-24 NOTE — ED ADULT TRIAGE NOTE - CHIEF COMPLAINT QUOTE
PMH of DM, HTN, HLD, cardiac stent(2015) (on Plavix)  C/o fall down stairs on Thursday, 14 steps, reports +head strike, occipital area, denies LOC. Now complaining headache, occipital area, bruising, left hip and left foot pain, mild dizziness. Speaks full complete sentences, unlabored breathing on RA.

## 2024-02-24 NOTE — ED PROVIDER NOTE - CARE PLAN
1 Principal Discharge DX:	Intercostal myalgia   Principal Discharge DX:	Multiple contusions  Secondary Diagnosis:	Fall

## 2024-02-24 NOTE — ED PROVIDER NOTE - CLINICAL SUMMARY MEDICAL DECISION MAKING FREE TEXT BOX
here with pain to right rib after cough    no fx. fall today and negative fractures 63-year-old female history of hypertension hyperlipidemia, carotid artery occlusion, CAD, NSTEMI, and obesity presents today accompanied with her patient was caring glass of water in the dark for evaluation status post fall down steps.  Patient states that she was at home carrying a glass of water in the dark she had a chemical fall.  Patient reports falling onto her buttocks, striking her lower back and the the back of her head, patient denies LOC but does report having left foot pain and swelling which has worsened since last night.  Patient also points to her left hip which is tender but she is able to ambulate.  On exam patient is awake alert and oriented x 3 non-lethargic appearing unable to follow commands.  Pertinent findings on exam include swollen left and tender foot with normal pedal pulses, left hip point tenderness with normal range of motion of the left lower extremity, neuroexam is normal, rest of exam is benign.  Will obtain CAT scan and x-rays to rule out fracture or bleed, medicate for pain, will reassess and dispo, case signed out to the oncoming physician  ct and xrays negative

## 2024-02-24 NOTE — ED ADULT NURSE NOTE - OBJECTIVE STATEMENT
Pt presented to ED post fall down 14 flights of stairs at home. C/o level 9/10 pain to Head and left foot. PMH DM/HTN

## 2024-02-24 NOTE — ED PROVIDER NOTE - PATIENT PORTAL LINK FT
You can access the FollowMyHealth Patient Portal offered by [facility] by registering at the following website: [email or web address]. By joining [employer]’s FollowMyHealth portal, you will also be able to view your health information using other applications (apps) compatible with our system.

## 2024-02-24 NOTE — ED PROVIDER NOTE - OBJECTIVE STATEMENT
"Anesthesia Transfer of Care Note    Patient: Otoniel Candelario    Procedure(s) Performed: Procedure(s) (LRB):  STERNOTOMY median, thymectomy (N/A)  ROBOTIC LAPAROSCOPY, EXPLORATORY (N/A)    Patient location: PACU    Anesthesia Type: general    Transport from OR: Transported from OR on 6-10 L/min O2 by face mask with adequate spontaneous ventilation    Post pain: adequate analgesia    Post assessment: no apparent anesthetic complications    Post vital signs: stable    Level of consciousness: awake, oriented and alert    Nausea/Vomiting: no nausea/vomiting    Complications: none          Last vitals:   Visit Vitals  /66 (BP Location: Right arm, Patient Position: Lying)   Pulse 69   Temp 36.6 °C (97.9 °F) (Temporal)   Resp 16   Ht 5' 11" (1.803 m)   Wt 103.4 kg (228 lb)   SpO2 95%   BMI 31.80 kg/m²     " see mdm

## 2024-02-24 NOTE — ED ADULT TRIAGE NOTE - HEART RATE (BEATS/MIN)
Problem: Knowledge Deficit  Goal: Patient/family/caregiver demonstrates understanding of disease process, treatment plan, medications, and discharge instructions  Description: Complete learning assessment and assess knowledge base    Interventions:  - Provide teaching at level of understanding  - Provide teaching via preferred learning methods  Outcome: Progressing     Problem: CARDIOVASCULAR - ADULT  Goal: Maintains optimal cardiac output and hemodynamic stability  Description: INTERVENTIONS:  - Monitor I/O, vital signs and rhythm  - Monitor for S/S and trends of decreased cardiac output  - Administer and titrate ordered vasoactive medications to optimize hemodynamic stability  - Assess quality of pulses, skin color and temperature  - Assess for signs of decreased coronary artery perfusion  - Instruct patient to report change in severity of symptoms  Outcome: Progressing  Goal: Absence of cardiac dysrhythmias or at baseline rhythm  Description: INTERVENTIONS:  - Continuous cardiac monitoring, vital signs, obtain 12 lead EKG if ordered  - Administer antiarrhythmic and heart rate control medications as ordered  - Monitor electrolytes and administer replacement therapy as ordered  Outcome: Progressing 84

## 2024-06-12 ENCOUNTER — TRANSCRIPTION ENCOUNTER (OUTPATIENT)
Age: 64
End: 2024-06-12

## 2024-07-11 ENCOUNTER — APPOINTMENT (OUTPATIENT)
Dept: VASCULAR SURGERY | Facility: CLINIC | Age: 64
End: 2024-07-11
Payer: MEDICAID

## 2024-07-11 VITALS
HEART RATE: 77 BPM | SYSTOLIC BLOOD PRESSURE: 147 MMHG | DIASTOLIC BLOOD PRESSURE: 79 MMHG | HEIGHT: 67 IN | WEIGHT: 185 LBS | TEMPERATURE: 98.1 F | BODY MASS INDEX: 29.03 KG/M2

## 2024-07-11 VITALS — HEART RATE: 76 BPM | DIASTOLIC BLOOD PRESSURE: 93 MMHG | SYSTOLIC BLOOD PRESSURE: 146 MMHG

## 2024-07-11 DIAGNOSIS — I65.21 OCCLUSION AND STENOSIS OF RIGHT CAROTID ARTERY: ICD-10-CM

## 2024-07-11 PROCEDURE — 99203 OFFICE O/P NEW LOW 30 MIN: CPT

## 2024-07-11 RX ORDER — GLIPIZIDE 2.5 MG/1
TABLET ORAL
Refills: 0 | Status: ACTIVE | COMMUNITY

## 2024-07-22 ENCOUNTER — APPOINTMENT (OUTPATIENT)
Dept: VASCULAR SURGERY | Facility: CLINIC | Age: 64
End: 2024-07-22
Payer: MEDICAID

## 2024-07-22 PROCEDURE — 93880 EXTRACRANIAL BILAT STUDY: CPT

## 2024-07-26 ENCOUNTER — APPOINTMENT (OUTPATIENT)
Dept: VASCULAR SURGERY | Facility: CLINIC | Age: 64
End: 2024-07-26
Payer: MEDICAID

## 2024-07-26 DIAGNOSIS — I65.21 OCCLUSION AND STENOSIS OF RIGHT CAROTID ARTERY: ICD-10-CM

## 2024-07-26 PROCEDURE — 99441: CPT

## 2024-07-26 NOTE — HISTORY OF PRESENT ILLNESS
[FreeTextEntry1] :   JANETH MANE (: May  8 1960) is a 64 year old female with history of carotid artery stenosis.   She had a R CEA in  for asymptomatic high grade stenosis.  No prior h/o stroke or TIA.  Denies focal neurologic deficits including amaurosis fugax, slurred speech, and weakness in the arms/legs.  On Plavix, aspirin, and atorvastatin.   PMH: CAD/MI, h/o cardiac stents (most recent ), HTN, NIDDM  2024 Carotid duplex demonstrates a patent R CEA and mild LICA stenosis. Bilateral antegrade vertebral arteries.   A/P:  Carotid artery stenosis s/p R CEA with patent repair and mild contralateral disease. Follow up in 1 year for repeat duplex.

## 2024-07-26 NOTE — REASON FOR VISIT
[Home] : at home, [unfilled] , at the time of the visit. [Medical Office: ([facility])___] : at the medical office located in  [Verbal consent obtained from patient] : the patient, [unfilled]

## 2024-12-17 ENCOUNTER — EMERGENCY (EMERGENCY)
Facility: HOSPITAL | Age: 64
LOS: 0 days | Discharge: HOME | End: 2024-12-17
Attending: EMERGENCY MEDICINE
Payer: MEDICAID

## 2024-12-17 VITALS
DIASTOLIC BLOOD PRESSURE: 82 MMHG | TEMPERATURE: 98 F | OXYGEN SATURATION: 95 % | HEART RATE: 86 BPM | RESPIRATION RATE: 19 BRPM | HEIGHT: 67 IN | SYSTOLIC BLOOD PRESSURE: 168 MMHG | WEIGHT: 179.02 LBS

## 2024-12-17 VITALS
DIASTOLIC BLOOD PRESSURE: 74 MMHG | HEART RATE: 84 BPM | RESPIRATION RATE: 17 BRPM | OXYGEN SATURATION: 99 % | SYSTOLIC BLOOD PRESSURE: 128 MMHG | TEMPERATURE: 98 F

## 2024-12-17 DIAGNOSIS — Z98.89 OTHER SPECIFIED POSTPROCEDURAL STATES: Chronic | ICD-10-CM

## 2024-12-17 PROCEDURE — 99284 EMERGENCY DEPT VISIT MOD MDM: CPT

## 2024-12-17 PROCEDURE — 72125 CT NECK SPINE W/O DYE: CPT | Mod: 26,MC

## 2024-12-17 PROCEDURE — 70450 CT HEAD/BRAIN W/O DYE: CPT | Mod: 26,MC

## 2024-12-17 RX ORDER — ACETAMINOPHEN 500MG 500 MG/1
650 TABLET, COATED ORAL ONCE
Refills: 0 | Status: COMPLETED | OUTPATIENT
Start: 2024-12-17 | End: 2024-12-17

## 2024-12-17 RX ADMIN — ACETAMINOPHEN 500MG 650 MILLIGRAM(S): 500 TABLET, COATED ORAL at 03:45

## 2024-12-17 NOTE — ED PROVIDER NOTE - PATIENT PORTAL LINK FT
You can access the FollowMyHealth Patient Portal offered by [facility] by registering at the following website: [email or web address]. By joining [facility]’s FollowMyHealth portal, you will also be able to view your health information using other applications (apps) compatible with our system.

## 2024-12-17 NOTE — ED PROVIDER NOTE - NSFOLLOWUPINSTRUCTIONS_ED_ALL_ED_FT
1) Take tylenol for pain  2) You can apply ice pack to area to minimize swelling  3) Follow up with your primary care doctor  4) Return to the ER for worsening or concerning symptoms

## 2024-12-17 NOTE — ED ADULT NURSE NOTE - OBJECTIVE STATEMENT
Pt is a 65yo female AAOx4 NKDA pmh htn dm s/p fall around 1am. Pt respirations equal and unlabored bilaterally. Pt reports trauma to the back of the head and pain 5/10. Pt denies any LOC, blood thinners, dizziness, blurry vision, cp , sob, abd pain, n/v/d at this time. Pt and family updated on plan of care.

## 2024-12-17 NOTE — ED ADULT NURSE NOTE - NSFALLHARMRISKINTERV_ED_ALL_ED

## 2024-12-17 NOTE — ED ADULT NURSE NOTE - NSFALLRISKFACTORS_ED_ALL_ED
No indicators present Spray Paint Text: The liquid nitrogen was applied to the skin utilizing a spray paint frosting technique. Number Of Freeze-Thaw Cycles: 1 freeze-thaw cycle Render Note In Bullet Format When Appropriate: Yes Include Z78.9 (Other Specified Conditions Influencing Health Status) As An Associated Diagnosis?: No Duration Of Freeze Thaw-Cycle (Seconds): 1 Consent: The patient's consent was obtained including but not limited to risks of crusting, scabbing, blistering, scarring, darker or lighter pigmentary change, recurrence, incomplete removal and infection. Medical Necessity Clause: This procedure was medically necessary because the lesions that were treated were: Medical Necessity Information: It is in your best interest to select a reason for this procedure from the list below. All of these items fulfill various CMS LCD requirements except the new and changing color options. Aperture Size (Optional): D Post-Care Instructions: I reviewed with the patient in detail post-care instructions. Patient is to wear sunprotection, and avoid picking at any of the treated lesions. Pt may apply Vaseline to crusted or scabbing areas. Application Tool (Optional): Cry-AC Detail Level: Detailed

## 2024-12-17 NOTE — ED ADULT TRIAGE NOTE - ACCOMPANIED BY
Emergency Department  [address] [place] [id number]  Phone:  [phone]  Fax:  [phone]                                    John Juárez   MRN: [id number]    Department:   Emergency Department   Date of Visit:  2/17/2020           After Visit Summary Signature Page    I have received my discharge instructions, and my questions have been answered. I have discussed any challenges I see with this plan with the nurse or doctor.    ..........................................................................................................................................  Patient/Patient Representative Signature      ..........................................................................................................................................  Patient Representative Print Name and Relationship to Patient    ..................................................               ................................................  Date                                   Time    ..........................................................................................................................................  Reviewed by Signature/Title    ...................................................              ..............................................  Date                                               Time          22EPIC Rev 08/18        Self

## 2024-12-17 NOTE — ED ADULT TRIAGE NOTE - CHIEF COMPLAINT QUOTE
S/P hx of DM, stent placement on ASA PT reports she rolled out of bed hit head on floor PW back of head pain. denies LOC, dizziness .

## 2025-03-10 NOTE — ED ADULT NURSE NOTE - NSFALLLASTSIX_ED_ALL_ED
Detail Level: Generalized General Sunscreen Counseling: I recommended a broad spectrum sunscreen with a SPF of 30 or higher.  I explained that SPF 30 sunscreens block approximately 97 percent of the sun's harmful rays.  Sunscreens should be applied at least 15 minutes prior to expected sun exposure and then every 2 hours after that as long as sun exposure continues. If swimming or exercising sunscreen should be reapplied every 45 minutes to an hour after getting wet or sweating. No.